# Patient Record
Sex: FEMALE | Race: WHITE | NOT HISPANIC OR LATINO | URBAN - NONMETROPOLITAN AREA
[De-identification: names, ages, dates, MRNs, and addresses within clinical notes are randomized per-mention and may not be internally consistent; named-entity substitution may affect disease eponyms.]

---

## 2023-05-18 NOTE — PROGRESS NOTES
Subjective   Patient ID:   Alexus Espinoza is a 28 y.o. female who presents for New Patient Visit.  HPI  New patient here today to establish care with myself.  Previous PCP: N/A  Last seen: N/A    Elevated BP:  BP is 142/99.  BP was also recently high at her dentist appointment.  Not on BP medication.  Bps were also high when she was last pregnant.    Prediabetes:  No recent A1C.  Not on medication for this.  DUE for labs.    Health maintenance:  Smoking: Vaping daily  Labs: DUE  Influenza:    Review of Systems  12 point review of systems negative unless stated above in HPI    Vitals:    05/22/23 1008   BP: (!) 142/99   Pulse: 79   SpO2: 98%       Physical Exam  General: Alert and oriented, well nourished, no acute distress.  Lungs: Clear to auscultation, non-labored respiration.  Heart: Normal rate, regular rhythm, no murmur, gallop or edema.  Neurologic: Awake, alert, and oriented X3, CN II-XII intact.  Psychiatric: Cooperative, appropriate mood and affect.    Assessment/Plan   It was nice meeting you!  I have ordered some labs to be done as soon as you can.  We will call you with the results.  Let's start Lisinopril for the BP.  Please start checking daily at home as well.  Call if getting over 140/90 regularly.  Call with questions or concerns.    Fu 1 month  Diagnoses and all orders for this visit:  Elevated blood pressure reading  Prediabetes  -     Comprehensive Metabolic Panel; Future  -     Lipid Panel; Future  -     CBC and Auto Differential; Future  -     Hemoglobin A1C; Future  Primary hypertension  -     lisinopril 5 mg tablet; Take 1 tablet (5 mg) by mouth once daily.

## 2023-05-22 ENCOUNTER — OFFICE VISIT (OUTPATIENT)
Dept: PRIMARY CARE | Facility: CLINIC | Age: 28
End: 2023-05-22
Payer: COMMERCIAL

## 2023-05-22 VITALS
HEART RATE: 79 BPM | DIASTOLIC BLOOD PRESSURE: 99 MMHG | OXYGEN SATURATION: 98 % | WEIGHT: 220.6 LBS | BODY MASS INDEX: 32.67 KG/M2 | HEIGHT: 69 IN | SYSTOLIC BLOOD PRESSURE: 142 MMHG

## 2023-05-22 DIAGNOSIS — R73.03 PREDIABETES: ICD-10-CM

## 2023-05-22 DIAGNOSIS — R03.0 ELEVATED BLOOD PRESSURE READING: Primary | ICD-10-CM

## 2023-05-22 DIAGNOSIS — I10 PRIMARY HYPERTENSION: ICD-10-CM

## 2023-05-22 PROCEDURE — 3077F SYST BP >= 140 MM HG: CPT | Performed by: PHYSICIAN ASSISTANT

## 2023-05-22 PROCEDURE — 3080F DIAST BP >= 90 MM HG: CPT | Performed by: PHYSICIAN ASSISTANT

## 2023-05-22 PROCEDURE — 99203 OFFICE O/P NEW LOW 30 MIN: CPT | Performed by: PHYSICIAN ASSISTANT

## 2023-05-22 RX ORDER — LISINOPRIL 5 MG/1
5 TABLET ORAL DAILY
Qty: 30 TABLET | Refills: 1 | Status: SHIPPED | OUTPATIENT
Start: 2023-05-22 | End: 2023-07-21

## 2023-06-13 PROBLEM — I10 PRIMARY HYPERTENSION: Status: ACTIVE | Noted: 2023-06-13

## 2023-06-13 NOTE — PROGRESS NOTES
Subjective   Patient ID:   Alexus Espinoza is a 28 y.o. female who presents for No chief complaint on file..  HPI  Labs were ordered and not done.    HTN:  We started Lisinopril last visit.  BP is   BP was also recently high at her dentist appointment.  Bps were also high when she was last pregnant.    Prediabetes:  No recent A1C.  Not on medication for this.  DUE for labs.    Health maintenance:  Smoking: Vaping daily  Labs: DUE  Influenza:    Review of Systems  12 point review of systems negative unless stated above in HPI    There were no vitals filed for this visit.      Physical Exam  General: Alert and oriented, well nourished, no acute distress.  Lungs: Clear to auscultation, non-labored respiration.  Heart: Normal rate, regular rhythm, no murmur, gallop or edema.  Neurologic: Awake, alert, and oriented X3, CN II-XII intact.  Psychiatric: Cooperative, appropriate mood and affect.    Assessment/Plan   Diagnoses and all orders for this visit:  Prediabetes  Primary hypertension

## 2023-06-21 ENCOUNTER — APPOINTMENT (OUTPATIENT)
Dept: PRIMARY CARE | Facility: CLINIC | Age: 28
End: 2023-06-21
Payer: COMMERCIAL